# Patient Record
Sex: MALE | Race: ASIAN | Employment: FULL TIME
[De-identification: names, ages, dates, MRNs, and addresses within clinical notes are randomized per-mention and may not be internally consistent; named-entity substitution may affect disease eponyms.]

---

## 2022-05-10 PROBLEM — R06.09 DYSPNEA ON EXERTION: Status: ACTIVE | Noted: 2022-05-10

## 2022-05-10 PROBLEM — Z02.0 ENCOUNTER FOR SCHOOL HISTORY AND PHYSICAL EXAMINATION: Status: ACTIVE | Noted: 2022-05-10

## 2022-05-10 PROBLEM — Z13.220 LIPID SCREENING: Status: ACTIVE | Noted: 2022-05-10

## 2022-05-10 PROBLEM — R00.2 PALPITATIONS: Status: ACTIVE | Noted: 2022-05-10

## 2022-06-02 ENCOUNTER — TELEPHONE (OUTPATIENT)
Dept: INTERNAL MEDICINE CLINIC | Facility: CLINIC | Age: 27
End: 2022-06-02

## 2022-06-02 DIAGNOSIS — Z11.1 TUBERCULOSIS SCREENING: Primary | ICD-10-CM

## 2022-06-02 NOTE — TELEPHONE ENCOUNTER
----- Message from Fallon Henderson sent at 6/2/2022  3:27 PM EDT -----  Subject: Referral Request    QUESTIONS   Reason for referral request? Patient needs TB QUANTIFORON GOLD blood test   for school. 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   Has the physician seen you for this condition before? No   Preferred Specialist (if applicable)? Do you already have an appointment scheduled? No  Additional Information for Provider?   ---------------------------------------------------------------------------  --------------  CALL BACK INFO  What is the best way for the office to contact you? OK to leave message on   voicemail  Preferred Call Back Phone Number? [phone]  ---------------------------------------------------------------------------  --------------  SCRIPT ANSWERS  Relationship to Patient?  Self

## 2022-06-09 PROBLEM — Z13.220 LIPID SCREENING: Status: RESOLVED | Noted: 2022-05-10 | Resolved: 2022-06-09

## 2022-06-23 DIAGNOSIS — Z11.1 TUBERCULOSIS SCREENING: ICD-10-CM

## 2022-06-29 LAB
M TB IFN-G BLD-IMP: NEGATIVE
QUANTIFERON CRITERIA: NORMAL
QUANTIFERON MITOGEN VALUE: >10 IU/ML
QUANTIFERON NIL VALUE: 0.11 IU/ML
QUANTIFERON TB1 AG: 0.45 IU/ML
QUANTIFERON TB2 AG: 0.2 IU/ML
QUANTIFERON, INCUBATION: NORMAL

## 2022-07-29 ENCOUNTER — TELEPHONE (OUTPATIENT)
Dept: CARDIOLOGY CLINIC | Age: 27
End: 2022-07-29

## 2022-07-29 NOTE — TELEPHONE ENCOUNTER
Spoke with patient - advised him per Dr Margoth Dexter, his echocardiogram showed normal pumping ability of the heart with no significant valve disease. The left upper chamber of the heart is moderately enlarged and is commonly associated with atrial fibrillation. If he can keep a log of his heart rates and blood pressures once or twice a day and bring this in with him when he sees me in follow-up on the first, this would be beneficial.      Please let the patient know that we reviewed his stress test and he certainly passed it. He had normal pumping ability of the heart with no concern for valve disease also noted on the echo which is reassuring. Pt verbalized understanding of findings. Appointment was scheduled.

## 2022-07-29 NOTE — TELEPHONE ENCOUNTER
----- Message from Ashwini Whitten MD sent at 7/29/2022  1:29 PM EDT -----  Please let the patient know that I reviewed his echocardiogram and it shows normal pumping ability of the heart with no significant valve disease. The left upper chamber of the heart is moderately enlarged and is commonly associated with atrial fibrillation. If he can keep a log of his heart rates and blood pressures once or twice a day and bring this in with him when he sees me in follow-up on the first, this would be beneficial.  Please let the patient know that we reviewed his stress test and he certainly passed it. He had normal pumping ability of the heart with no concern for valve disease also noted on the echo which is reassuring.   Thanks,  AD

## 2022-10-26 ENCOUNTER — OFFICE VISIT (OUTPATIENT)
Dept: INTERNAL MEDICINE CLINIC | Facility: CLINIC | Age: 27
End: 2022-10-26
Payer: COMMERCIAL

## 2022-10-26 VITALS
HEART RATE: 83 BPM | BODY MASS INDEX: 24.49 KG/M2 | DIASTOLIC BLOOD PRESSURE: 70 MMHG | HEIGHT: 65 IN | WEIGHT: 147 LBS | SYSTOLIC BLOOD PRESSURE: 112 MMHG | OXYGEN SATURATION: 98 % | TEMPERATURE: 97.2 F

## 2022-10-26 DIAGNOSIS — E78.2 HYPERLIPIDEMIA, MIXED: ICD-10-CM

## 2022-10-26 DIAGNOSIS — Z02.0 ENCOUNTER FOR SCHOOL HISTORY AND PHYSICAL EXAMINATION: ICD-10-CM

## 2022-10-26 DIAGNOSIS — G89.29 CHRONIC RIGHT HIP PAIN: ICD-10-CM

## 2022-10-26 DIAGNOSIS — R00.2 PALPITATIONS: ICD-10-CM

## 2022-10-26 DIAGNOSIS — M25.551 CHRONIC RIGHT HIP PAIN: ICD-10-CM

## 2022-10-26 PROBLEM — R06.02 SHORTNESS OF BREATH: Status: ACTIVE | Noted: 2022-05-10

## 2022-10-26 PROBLEM — R06.09 DYSPNEA ON EXERTION: Status: RESOLVED | Noted: 2022-05-10 | Resolved: 2022-10-26

## 2022-10-26 PROBLEM — R06.02 SHORTNESS OF BREATH: Status: RESOLVED | Noted: 2022-05-10 | Resolved: 2022-10-26

## 2022-10-26 PROBLEM — R06.09 DYSPNEA ON EXERTION: Status: ACTIVE | Noted: 2022-05-10

## 2022-10-26 PROCEDURE — 99213 OFFICE O/P EST LOW 20 MIN: CPT | Performed by: INTERNAL MEDICINE

## 2022-10-26 ASSESSMENT — ENCOUNTER SYMPTOMS
STRIDOR: 0
EYE PAIN: 0
VOICE CHANGE: 0
CHOKING: 0
RECTAL PAIN: 0

## 2022-10-26 NOTE — PROGRESS NOTES
FOLLOWUP VISIT    Subjective:    Mr. Sammie Jorge is a 32 y.o., male,   Chief Complaint   Patient presents with    Hip Pain     C/o R hip pain, injured while snowboarding       HPI:    Patient fell while snowboarding last winter and landed on his right side. He reported pain and tenderness in the mid-axillary line just above his pelvis. Pain was worse when twisting his trunk to carve turns on the snowboard. Has slowly improved but not resolved. The following portions of the patient's history were reviewed and updated as appropriate:      Past Medical History:   Diagnosis Date    Hyperlipidemia, mixed     Palpitations        No past surgical history on file. Family History   Problem Relation Age of Onset    High Cholesterol Father     Hypertension Father     Thyroid Disease Mother        Social History     Socioeconomic History    Marital status: Legally      Spouse name: Not on file    Number of children: Not on file    Years of education: Not on file    Highest education level: Not on file   Occupational History    Not on file   Tobacco Use    Smoking status: Never    Smokeless tobacco: Never   Substance and Sexual Activity    Alcohol use: Yes     Alcohol/week: 3.0 standard drinks    Drug use: Not Currently    Sexual activity: Not on file   Other Topics Concern    Not on file   Social History Narrative    Not on file     Social Determinants of Health     Financial Resource Strain: Not on file   Food Insecurity: Not on file   Transportation Needs: Not on file   Physical Activity: Not on file   Stress: Not on file   Social Connections: Not on file   Intimate Partner Violence: Not on file   Housing Stability: Not on file       No current outpatient medications on file. No current facility-administered medications for this visit. Allergies as of 10/26/2022    (No Known Allergies)       Review of Systems   Constitutional:  Negative for activity change and appetite change.    HENT:  Negative for drooling and voice change. Eyes:  Negative for pain. Respiratory:  Negative for choking and stridor. Gastrointestinal:  Negative for rectal pain. Endocrine: Negative for polydipsia and polyphagia. Genitourinary:  Negative for enuresis and penile pain. Musculoskeletal:  Negative for gait problem and neck stiffness. Skin:  Negative for pallor. Allergic/Immunologic: Negative for immunocompromised state. Neurological:  Negative for facial asymmetry and speech difficulty. Hematological:  Does not bruise/bleed easily. Psychiatric/Behavioral:  Negative for self-injury. The patient is not hyperactive. Patient Care Team:  Miley Sharma MD as PCP - General  Miley Sharma MD as PCP - [facility] Empaneled Provider    Objective:    /70 (Site: Left Upper Arm, Position: Sitting)   Pulse 83   Temp 97.2 °F (36.2 °C) (Temporal)   Ht 5' 5\" (1.651 m)   Wt 147 lb (66.7 kg)   SpO2 98%   BMI 24.46 kg/m²     Physical Exam  Constitutional:       General: He is not in acute distress. Appearance: He is not toxic-appearing. HENT:      Head: Normocephalic and atraumatic. Nose: Nose normal.   Eyes:      Extraocular Movements: Extraocular movements intact. Conjunctiva/sclera: Conjunctivae normal.   Pulmonary:      Effort: Pulmonary effort is normal. No respiratory distress. Breath sounds: No stridor. Musculoskeletal:      Comments: Right hip FROM no pain. No bony pain or deformity over pelvis or greater trochanter. Skin:     Coloration: Skin is not jaundiced or pale. Neurological:      Mental Status: He is alert and oriented to person, place, and time. Psychiatric:         Thought Content:  Thought content normal.            Ancillary Procedure on 07/27/2022   Component Date Value Ref Range Status    Stress Target HR 07/27/2022 193  bpm Final    LV EDV A2C 07/27/2022 80  mL Final    LV EDV A4C 07/27/2022 91  mL Final    LV ESV A2C 07/27/2022 30  mL Final    LV ESV A4C 07/27/2022 35 mL Final    IVSd 07/27/2022 0.7  0.6 - 1.0 cm Final    LVIDd 07/27/2022 4.8  4.2 - 5.9 cm Final    LVIDs 07/27/2022 3.2  cm Final    LVOT Peak Velocity 07/27/2022 1.0  m/s Final    LVOT Peak Gradient 07/27/2022 4  mmHg Final    LVPWd 07/27/2022 0.9  0.6 - 1.0 cm Final    LV E' Lateral Velocity 07/27/2022 17  cm/s Final    LV E' Septal Velocity 07/27/2022 11  cm/s Final    LV Ejection Fraction A2C 07/27/2022 63  % Final    LV Ejection Fraction A4C 07/27/2022 62  % Final    EF BP 07/27/2022 62  55 - 100 % Final    LA Minor Axis 07/27/2022 4.5  cm Final    LA Major Axis 07/27/2022 4.5  cm Final    LA Area 2C 07/27/2022 15.6  cm2 Final    LA Area 4C 07/27/2022 13.7  cm2 Final    LA Volume BP 07/27/2022 36  18 - 58 mL Final    LA Diameter 07/27/2022 2.9  cm Final    AV Peak Velocity 07/27/2022 1.0  m/s Final    AV Peak Gradient 07/27/2022 4  mmHg Final    Aortic Root 07/27/2022 2.9  cm Final    MV E Wave Deceleration Time 07/27/2022 230.0  ms Final    MV A Velocity 07/27/2022 0.37  m/s Final    MV E Velocity 07/27/2022 0.78  m/s Final    PV Max Velocity 07/27/2022 1.1  m/s Final    PV Peak Gradient 07/27/2022 5  mmHg Final    RVIDd 07/27/2022 2.8  cm Final    TR Max Velocity 07/27/2022 1.51  m/s Final    TR Peak Gradient 07/27/2022 9  mmHg Final    MV E/A 07/27/2022 2.11   Final    LA/AO Root Ratio 07/27/2022 1.00   Final    AV Velocity Ratio 07/27/2022 1.00   Final    LV Mass 2D 07/27/2022 126.7  88 - 224 g Final    E/E' Lateral 07/27/2022 4.59   Final    E/E' Septal 07/27/2022 7.09   Final    Fractional Shortening 2D 07/27/2022 33  28 - 44 % Final    E/E' Ratio (Averaged) 07/27/2022 5.84   Final    LV RWT Ratio 07/27/2022 0.38   Final    Body Surface Area 07/27/2022 1.77  m2 Final    LV ESV Index A4C 07/27/2022 20  mL/m2 Final    LV EDV Index A4C 07/27/2022 52  mL/m2 Final    LV ESV Index A2C 07/27/2022 17  mL/m2 Final    LV EDV Index A2C 07/27/2022 46  mL/m2 Final    LVIDd Index 07/27/2022 2.74  cm/m2 Final    LVIDs Index 07/27/2022 1.83  cm/m2 Final    LV Mass 2D Index 07/27/2022 72.4  49 - 115 g/m2 Final    LA Volume Index BP 07/27/2022 21  16 - 34 ml/m2 Final    LA Size Index 07/27/2022 1.66  cm/m2 Final    Ao Root Index 07/27/2022 1.66  cm/m2 Final    RV Basal Dimension 07/27/2022 3.4  cm Final    RV Mid Dimension 07/27/2022 2.3  cm Final    Baseline HR 07/27/2022 74  bpm Final    Baseline Systolic BP 10/93/9773 695.8  mmHg Final    Baseline Diastolic BP 26/68/8473 91.5  mmHg Final    Stress Peak HR 07/27/2022 195  bpm Final    Stress Systolic BP 00/17/7893 016.2  mmHg Final    Stress Diastolic BP 19/07/0833 87.4  mmHg Final    Stress Rate Pressure Product 07/27/2022 27,300  bpm*mmHg Final    Stress Percent HR Achieved 07/27/2022 101  % Final    Exercise Duration Time 07/27/2022 12  min Final    Exercuse Duration Seconds 07/27/2022 57  sec Final    Stress Estimated Workload 07/27/2022 14.3  METS Final    Angina Index 07/27/2022 0   Final    Baseline ST Depression 07/27/2022 0  mm Final    Left Ventricular Ejection Fraction 07/27/2022 63   Final-Edited    LVEF MODALITY 07/27/2022 ECHO   Final-Edited         Assessent & Plan:        1. Palpitations  Overview:  See 5/10/2022 office note. 5/10/22 EKG - NSR with normal intervals and axes. No T wave abnormality. He has anterolateral early repolarization pattern. Otherwise normal EKG.    5/10/22 CBC, CMP, Mg, TSH normal.    7/27/22 exercise stress echo normal.   2. Encounter for school history and physical examination  Overview:  I find no contraindication for the patient to participate in academic classwork. The form was signed and returned to patient. Copy was reserved to be scanned into the electronic medical record. 3. Hyperlipidemia, mixed  Overview:  5/10/22 total 204 HDL 48  . Labs were reviewed and discussed with patient. Recommended diet therapy. 4. Chronic right hip pain  Overview:  Refer to ortho sports medicine.     Orders:  - [id number] [facility]        The patient and/or patient representative voiced understanding and agreement with the current diagnoses, recommendations, and possible side effects. Return if symptoms worsen or fail to improve.

## 2022-10-27 ENCOUNTER — OFFICE VISIT (OUTPATIENT)
Dept: ORTHOPEDIC SURGERY | Age: 27
End: 2022-10-27
Payer: COMMERCIAL

## 2022-10-27 VITALS — HEIGHT: 66 IN | BODY MASS INDEX: 23.5 KG/M2 | WEIGHT: 146.2 LBS

## 2022-10-27 DIAGNOSIS — M54.50 LOW BACK PAIN, UNSPECIFIED BACK PAIN LATERALITY, UNSPECIFIED CHRONICITY, UNSPECIFIED WHETHER SCIATICA PRESENT: Primary | ICD-10-CM

## 2022-10-27 PROCEDURE — 99204 OFFICE O/P NEW MOD 45 MIN: CPT | Performed by: NURSE PRACTITIONER

## 2022-10-27 RX ORDER — MELOXICAM 15 MG/1
15 TABLET ORAL DAILY PRN
Qty: 14 TABLET | Refills: 0 | Status: SHIPPED | OUTPATIENT
Start: 2022-10-27

## 2022-10-27 NOTE — PROGRESS NOTES
Name: Anthony Lund  YOB: 1995  Gender: male  MRN: [id number]    CC: Right lower back pain    HPI: This is a 32y.o. year old male who has had a 7-month history of right-sided lower back pain rating around to the top of the iliac crest after a fall snowboarding in March. He does not have a significant pain but just states that there is a nagging pain especially the day after he exercises. If he is doing squats he gets a lot of pressure onto the right lower back and hip. He has no groin pain. There is no radiculopathy. He takes ibuprofen intermittently. This patient  has not had lumbar surgery in the past.     Thus far, the patient has tried : Occasional NSAIDs  Current pain level: 1  Activities limited by pain: Exercise       AMB PAIN ASSESSMENT 10/27/2022   Location of Pain Back   Severity of Pain 1   Frequency of Pain Intermittent   Limiting Behavior No   Result of Injury No   Work-Related Injury No   Are there other pain locations you wish to document? No            ROS/Meds/PSH/PMH/FH/SH: I personally reviewed the patient's collected intake data. Below are the pertinents:    No Known Allergies      Current Outpatient Medications:     meloxicam (MOBIC) 15 MG tablet, Take 1 tablet by mouth daily as needed for Pain, Disp: 14 tablet, Rfl: 0    No past surgical history on file. Patient Active Problem List   Diagnosis    Palpitations    Encounter for school history and physical examination    Hyperlipidemia, mixed    Chronic right hip pain         Tobacco:  reports that he has never smoked. He has never used smokeless tobacco.  Alcohol:   Social History     Substance and Sexual Activity   Alcohol Use Yes    Alcohol/week: 3.0 standard drinks        Physical Exam:   Body mass index is 23.96 kg/m².   GENERAL:  Adult in no acute distress, well developed, well nourished Patient is appropriately conversant  MSK:  Examination of the lumbar spine reveals paraspinal tenderness    There is mild tenderness to palpation along the spinous processes and paraspinal musculature. The patient ambulates with a normal gait. ROM of bilateral hip(s) reveals no irritability. NEURO:  Cranial nerves grossly intact. No motor deficits. Straight leg testing is negative bilateral  Sensory testing reveals intact sensation to light touch and in the distribution of the L3-S1 dermatomes bilaterally  Ankle jerk is negative for clonus    Reflexes   Right Left   Quadriceps (L4) 2 2   Achilles (S1) 2 2     Strength testing in the lower extremity reveals the following based on the 5 point grading scale:     HF (L2) H Ab (L5) KE (L3/4) ADF (L4) EHL (L5) A Ev (S1) APF (S1)   Right 5 5 5 5 5 5 5   Left 5 5 5 5 5 5 5     PSYCH:  Alert and oriented X 3. Appropriate affect. Intact judgment and insight. Radiographic Studies:     AP, lateral and spot views of the lumbar spine:      Patient has good lordosis. Intervertebral to spaces are well-preserved. No fractures or lesions. No evidence of pars defect that I can see. Interpretation: Negative lumbar spine      Assessment/Plan:       Diagnosis Orders   1. Low back pain, unspecified back pain laterality, unspecified chronicity, unspecified whether sciatica present  XR LUMBAR SPINE (2-3 VIEWS)          This patient's clinical history and physical exam is consistent with myofascial and spondylitic  back pain. Patient to a short duration of NSAIDs daily for 2 weeks. Then also will proceed with a home exercise program.  I do not see any evidence of a pars defect but cannot again exclude a stress fracture there. If he still continues to have some discomfort after 6 weeks of conservative treatment we will look at getting an MRI of the lumbar spine. I discussed with him the natural history of this condition in that most episodes are typically self limited. However, the symptoms can last for several months if not even longer.  What I currently recommend is that he continues with conservative treatments to help cope with the symptoms and avoid having back surgery at this time. He understands that conservative treatments typically include activity modification, NSAIDs and physical therapy. Oral and/or epidural steroids could be considered in resistant scenarios. Also, he  may want to explore chiropractic care or acupuncture. I advised to avoid any prolonged bedrest and to try to maintain ADLs as much as possible. The patient was counseled to follow up me should he  develop any neurologic symptoms such as leg pain. - A home exercise program was prescribed for stretching and strengthening. A list of exercises was provided. - If the patient fails to respond to these efforts, I would recommend MRI of the lumbar spine for further evaluation.  - NSAID: The patient is agreeable to a trial of nonsteroidal anti-inflammatory drugs (NSAIDs). We discussed risks associated with their use, including GI tract or other bleeding, and also some cardiac risk. Instructions were given to discontinue the NSAID if there is any sign of GI bleed, chest pain, or shortness of breath. Continued use of NSAIDS is recommended to be managed by PCP to monitor kidney and liver function. Orders Placed This Encounter   Medications    meloxicam (MOBIC) 15 MG tablet     Sig: Take 1 tablet by mouth daily as needed for Pain     Dispense:  14 tablet     Refill:  0        Orders Placed This Encounter   Procedures    XR LUMBAR SPINE (2-3 VIEWS)        4 This is a undiagnosed new problem with uncertain prognosis      Return in about 6 weeks (around 12/8/2022). EAN Shen - CNP  10/27/22      Elements of this note were created using speech recognition software. As such, errors of speech recognition may be present.